# Patient Record
Sex: FEMALE | Race: WHITE | NOT HISPANIC OR LATINO | Employment: FULL TIME | URBAN - METROPOLITAN AREA
[De-identification: names, ages, dates, MRNs, and addresses within clinical notes are randomized per-mention and may not be internally consistent; named-entity substitution may affect disease eponyms.]

---

## 2020-11-23 ENCOUNTER — APPOINTMENT (OUTPATIENT)
Dept: CT IMAGING | Facility: HOSPITAL | Age: 20
End: 2020-11-23

## 2020-11-23 ENCOUNTER — HOSPITAL ENCOUNTER (EMERGENCY)
Facility: HOSPITAL | Age: 20
Discharge: HOME | End: 2020-11-23
Attending: EMERGENCY MEDICINE | Admitting: EMERGENCY MEDICINE

## 2020-11-23 VITALS
OXYGEN SATURATION: 98 % | RESPIRATION RATE: 16 BRPM | SYSTOLIC BLOOD PRESSURE: 119 MMHG | WEIGHT: 125 LBS | BODY MASS INDEX: 19.62 KG/M2 | TEMPERATURE: 97.3 F | HEART RATE: 95 BPM | DIASTOLIC BLOOD PRESSURE: 53 MMHG | HEIGHT: 67 IN

## 2020-11-23 DIAGNOSIS — R10.9 MIGRATORY ABDOMINAL PAIN: Primary | ICD-10-CM

## 2020-11-23 DIAGNOSIS — K59.00 CONSTIPATION, UNSPECIFIED CONSTIPATION TYPE: ICD-10-CM

## 2020-11-23 LAB
ALBUMIN SERPL-MCNC: 5.2 G/DL (ref 3.5–5.2)
ALBUMIN/GLOB SERPL: 1.6 G/DL
ALP SERPL-CCNC: 81 U/L (ref 39–117)
ALT SERPL W P-5'-P-CCNC: 9 U/L (ref 1–33)
ANION GAP SERPL CALCULATED.3IONS-SCNC: 13 MMOL/L (ref 5–15)
AST SERPL-CCNC: 18 U/L (ref 1–32)
B-HCG UR QL: NEGATIVE
BACTERIA UR QL AUTO: ABNORMAL /HPF
BASOPHILS # BLD AUTO: 0.05 10*3/MM3 (ref 0–0.2)
BASOPHILS NFR BLD AUTO: 0.5 % (ref 0–1.5)
BILIRUB SERPL-MCNC: 0.5 MG/DL (ref 0–1.2)
BILIRUB UR QL STRIP: NEGATIVE
BUN SERPL-MCNC: 11 MG/DL (ref 6–20)
BUN/CREAT SERPL: 16.9 (ref 7–25)
CALCIUM SPEC-SCNC: 10.3 MG/DL (ref 8.6–10.5)
CHLORIDE SERPL-SCNC: 103 MMOL/L (ref 98–107)
CLARITY UR: CLEAR
CO2 SERPL-SCNC: 23 MMOL/L (ref 22–29)
COLOR UR: YELLOW
CREAT SERPL-MCNC: 0.65 MG/DL (ref 0.57–1)
DEPRECATED RDW RBC AUTO: 38.5 FL (ref 37–54)
EOSINOPHIL # BLD AUTO: 0.06 10*3/MM3 (ref 0–0.4)
EOSINOPHIL NFR BLD AUTO: 0.6 % (ref 0.3–6.2)
ERYTHROCYTE [DISTWIDTH] IN BLOOD BY AUTOMATED COUNT: 11.9 % (ref 12.3–15.4)
GFR SERPL CREATININE-BSD FRML MDRD: 114 ML/MIN/1.73
GLOBULIN UR ELPH-MCNC: 3.3 GM/DL
GLUCOSE SERPL-MCNC: 95 MG/DL (ref 65–99)
GLUCOSE UR STRIP-MCNC: NEGATIVE MG/DL
HCT VFR BLD AUTO: 45.4 % (ref 34–46.6)
HGB BLD-MCNC: 15.2 G/DL (ref 12–15.9)
HGB UR QL STRIP.AUTO: NEGATIVE
HYALINE CASTS UR QL AUTO: ABNORMAL /LPF
IMM GRANULOCYTES # BLD AUTO: 0.04 10*3/MM3 (ref 0–0.05)
IMM GRANULOCYTES NFR BLD AUTO: 0.4 % (ref 0–0.5)
INTERNAL NEGATIVE CONTROL: NEGATIVE
INTERNAL POSITIVE CONTROL: POSITIVE
KETONES UR QL STRIP: NEGATIVE
LEUKOCYTE ESTERASE UR QL STRIP.AUTO: ABNORMAL
LIPASE SERPL-CCNC: 21 U/L (ref 13–60)
LYMPHOCYTES # BLD AUTO: 2.77 10*3/MM3 (ref 0.7–3.1)
LYMPHOCYTES NFR BLD AUTO: 28.3 % (ref 19.6–45.3)
Lab: NORMAL
MCH RBC QN AUTO: 29.7 PG (ref 26.6–33)
MCHC RBC AUTO-ENTMCNC: 33.5 G/DL (ref 31.5–35.7)
MCV RBC AUTO: 88.7 FL (ref 79–97)
MONOCYTES # BLD AUTO: 0.42 10*3/MM3 (ref 0.1–0.9)
MONOCYTES NFR BLD AUTO: 4.3 % (ref 5–12)
NEUTROPHILS NFR BLD AUTO: 6.44 10*3/MM3 (ref 1.7–7)
NEUTROPHILS NFR BLD AUTO: 65.9 % (ref 42.7–76)
NITRITE UR QL STRIP: NEGATIVE
NRBC BLD AUTO-RTO: 0 /100 WBC (ref 0–0.2)
PH UR STRIP.AUTO: <=5 [PH] (ref 5–8)
PLATELET # BLD AUTO: 391 10*3/MM3 (ref 140–450)
PMV BLD AUTO: 10.7 FL (ref 6–12)
POTASSIUM SERPL-SCNC: 4 MMOL/L (ref 3.5–5.2)
PROT SERPL-MCNC: 8.5 G/DL (ref 6–8.5)
PROT UR QL STRIP: NEGATIVE
RBC # BLD AUTO: 5.12 10*6/MM3 (ref 3.77–5.28)
RBC # UR: ABNORMAL /HPF
REF LAB TEST METHOD: ABNORMAL
SODIUM SERPL-SCNC: 139 MMOL/L (ref 136–145)
SP GR UR STRIP: <=1.005 (ref 1–1.03)
SQUAMOUS #/AREA URNS HPF: ABNORMAL /HPF
UROBILINOGEN UR QL STRIP: ABNORMAL
WBC # BLD AUTO: 9.78 10*3/MM3 (ref 3.4–10.8)
WBC UR QL AUTO: ABNORMAL /HPF

## 2020-11-23 PROCEDURE — 85025 COMPLETE CBC W/AUTO DIFF WBC: CPT | Performed by: EMERGENCY MEDICINE

## 2020-11-23 PROCEDURE — 80053 COMPREHEN METABOLIC PANEL: CPT | Performed by: EMERGENCY MEDICINE

## 2020-11-23 PROCEDURE — 99283 EMERGENCY DEPT VISIT LOW MDM: CPT

## 2020-11-23 PROCEDURE — 81001 URINALYSIS AUTO W/SCOPE: CPT

## 2020-11-23 PROCEDURE — 83690 ASSAY OF LIPASE: CPT | Performed by: EMERGENCY MEDICINE

## 2020-11-23 PROCEDURE — 87086 URINE CULTURE/COLONY COUNT: CPT | Performed by: EMERGENCY MEDICINE

## 2020-11-23 PROCEDURE — 81025 URINE PREGNANCY TEST: CPT | Performed by: EMERGENCY MEDICINE

## 2020-11-23 PROCEDURE — 74177 CT ABD & PELVIS W/CONTRAST: CPT

## 2020-11-23 PROCEDURE — 25010000002 IOPAMIDOL 61 % SOLUTION: Performed by: EMERGENCY MEDICINE

## 2020-11-23 RX ORDER — SODIUM CHLORIDE 0.9 % (FLUSH) 0.9 %
10 SYRINGE (ML) INJECTION AS NEEDED
Status: DISCONTINUED | OUTPATIENT
Start: 2020-11-23 | End: 2020-11-23 | Stop reason: HOSPADM

## 2020-11-23 RX ADMIN — IOPAMIDOL 85 ML: 612 INJECTION, SOLUTION INTRAVENOUS at 12:15

## 2020-11-23 NOTE — ED PROVIDER NOTES
Subjective   Pt presents with 1 week history of intermittent burning pain.  Sometimes R flank, sometimes RLQ, sometimes across upper abdomen.  When it comes on it lasts several minutes at a time.  Worse with activity, not related to eating.  No fever, vomiting, diarrhea, vaginal bleeding/discharge, urinary symptoms, chest pain, cough or dyspnea.  Never had this before.  Has not tried anything at home.  Went to [facility] and was referred to ED for further eval.  No chronic medical conditions, no prior abdominal surgeries.      History provided by:  Patient      Review of Systems   Constitutional: Negative for fever.   Respiratory: Negative for cough and shortness of breath.    Cardiovascular: Negative for chest pain.   Gastrointestinal: Positive for abdominal pain. Negative for diarrhea and vomiting.   Genitourinary: Negative for difficulty urinating, dysuria, vaginal bleeding and vaginal discharge.   All other systems reviewed and are negative.      History reviewed. No pertinent past medical history.    No Known Allergies    History reviewed. No pertinent surgical history.    No family history on file.    Social History     Socioeconomic History   • Marital status: Single     Spouse name: Not on file   • Number of children: Not on file   • Years of education: Not on file   • Highest education level: Not on file           Objective   Physical Exam  Vitals signs and nursing note reviewed.   Constitutional:       General: She is not in acute distress.     Appearance: Normal appearance. She is not ill-appearing.   HENT:      Head: Normocephalic and atraumatic.      Mouth/Throat:      Mouth: Mucous membranes are moist.   Eyes:      General: No scleral icterus.        Right eye: No discharge.         Left eye: No discharge.      Conjunctiva/sclera: Conjunctivae normal.   Neck:      Musculoskeletal: Normal range of motion and neck supple.   Cardiovascular:      Rate and Rhythm: Normal rate and regular rhythm.      Heart sounds:  No murmur.   Pulmonary:      Effort: Pulmonary effort is normal. No respiratory distress.      Breath sounds: Normal breath sounds. No wheezing.   Abdominal:      General: Bowel sounds are normal. There is no distension.      Palpations: Abdomen is soft.      Tenderness: There is abdominal tenderness in the right lower quadrant and left lower quadrant. There is no guarding or rebound.   Musculoskeletal: Normal range of motion.         General: No swelling.   Skin:     General: Skin is warm and dry.      Findings: No rash.   Neurological:      General: No focal deficit present.      Mental Status: She is alert. Mental status is at baseline.   Psychiatric:         Mood and Affect: Mood normal.         Behavior: Behavior normal.         Thought Content: Thought content normal.         Procedures           ED Course         Labs, UA benign.  CT shows stool burden.  May or may not be the cause but no evidence of acute serious pathology.  Will recommend Miralax and if that doesn't solve her pain then she should follow up. Patient stable on serial rechecks.  Discussed findings, concerns, plan of care, expected course, reasons to return and followup.  Provided the opportunity to ask questions.                                    MDM  Number of Diagnoses or Management Options  Constipation, unspecified constipation type:   Migratory abdominal pain:      Amount and/or Complexity of Data Reviewed  Clinical lab tests: reviewed and ordered  Tests in the radiology section of CPT®: reviewed and ordered  Independent visualization of images, tracings, or specimens: yes        Final diagnoses:   Migratory abdominal pain   Constipation, unspecified constipation type            Vickey Pinzon MD  11/23/20 1529

## 2020-11-24 LAB — BACTERIA SPEC AEROBE CULT: ABNORMAL

## 2021-04-01 ENCOUNTER — IMMUNIZATION (OUTPATIENT)
Dept: VACCINE CLINIC | Facility: HOSPITAL | Age: 21
End: 2021-04-01

## 2021-04-01 PROCEDURE — 91300 HC SARSCOV02 VAC 30MCG/0.3ML IM: CPT | Performed by: INTERNAL MEDICINE

## 2021-04-01 PROCEDURE — 0001A: CPT | Performed by: INTERNAL MEDICINE

## 2021-04-27 ENCOUNTER — IMMUNIZATION (OUTPATIENT)
Dept: VACCINE CLINIC | Facility: HOSPITAL | Age: 21
End: 2021-04-27

## 2021-04-27 PROCEDURE — 0002A: CPT | Performed by: INTERNAL MEDICINE

## 2021-04-27 PROCEDURE — 91300 HC SARSCOV02 VAC 30MCG/0.3ML IM: CPT | Performed by: INTERNAL MEDICINE

## 2022-06-20 ENCOUNTER — TELEMEDICINE (OUTPATIENT)
Dept: FAMILY MEDICINE CLINIC | Facility: TELEHEALTH | Age: 22
End: 2022-06-20
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
## 2022-06-20 DIAGNOSIS — S50.362A INSECT BITE OF LEFT ELBOW, INITIAL ENCOUNTER: Primary | ICD-10-CM

## 2022-06-20 PROCEDURE — UCCVTSP: Performed by: NURSE PRACTITIONER

## 2022-06-20 RX ORDER — NORETHINDRONE ACETATE AND ETHINYL ESTRADIOL AND FERROUS FUMARATE 1MG-20(24)
KIT ORAL
COMMUNITY
Start: 2022-06-12

## 2022-06-20 RX ORDER — CEPHALEXIN 500 MG/1
500 CAPSULE ORAL 4 TIMES DAILY
Qty: 20 CAPSULE | Refills: 0 | Status: SHIPPED | OUTPATIENT
Start: 2022-06-20 | End: 2022-06-25

## 2022-06-20 RX ORDER — TRIAMCINOLONE ACETONIDE 1 MG/G
1 CREAM TOPICAL 2 TIMES DAILY
Qty: 80 G | Refills: 0 | Status: SHIPPED | OUTPATIENT
Start: 2022-06-20 | End: 2022-07-04

## 2022-06-20 NOTE — PROGRESS NOTES
Subjective   Chief Complaint   Patient presents with   • Insect Bite       Marisela Terrell is a 22 y.o. female.     Patient reports recent trip to the beach.  While on the beach she was bit several times on her arms by what she thinks may have been a mosquito or similar insect.  She states there is 1 bite on the left elbow that has worsened over the past few days, it is red, more swollen and firm to touch.     Insect Bite  This is a new problem. Episode onset: 4 days. The problem occurs constantly. The problem has been gradually worsening. Associated symptoms include numbness (intermitent tingling of the left arm) and a rash. Pertinent negatives include no abdominal pain, anorexia, chest pain, chills, congestion, coughing, diaphoresis, fatigue, fever, headaches, joint swelling, myalgias, nausea, sore throat, swollen glands, visual change, vomiting or weakness. Treatments tried: benadryl insect rub. The treatment provided no relief.        No Known Allergies    History reviewed. No pertinent past medical history.    History reviewed. No pertinent surgical history.    Social History     Socioeconomic History   • Marital status: Single   Tobacco Use   • Smoking status: Never Smoker   • Smokeless tobacco: Never Used       History reviewed. No pertinent family history.      Current Outpatient Medications:   •  cephalexin (Keflex) 500 MG capsule, Take 1 capsule by mouth 4 (Four) Times a Day for 5 days., Disp: 20 capsule, Rfl: 0  •  Hoda 24 Fe 1-20 MG-MCG(24) chewable tablet, , Disp: , Rfl:   •  triamcinolone (KENALOG) 0.1 % cream, Apply 1 application topically to the appropriate area as directed 2 (Two) Times a Day for 14 days., Disp: 80 g, Rfl: 0      Review of Systems   Constitutional: Negative for chills, diaphoresis, fatigue and fever.   HENT: Negative for congestion, sore throat and swollen glands.    Respiratory: Negative for cough, chest tightness, shortness of breath and wheezing.    Cardiovascular:  Negative for chest pain and palpitations.   Gastrointestinal: Negative for abdominal pain, anorexia, nausea and vomiting.   Musculoskeletal: Negative for joint swelling and myalgias.   Skin: Positive for rash.   Neurological: Positive for numbness (intermitent tingling of the left arm). Negative for dizziness, weakness, light-headedness and headache.        There were no vitals filed for this visit.    Objective   Physical Exam  Constitutional:       General: She is not in acute distress.     Appearance: Normal appearance. She is not ill-appearing, toxic-appearing or diaphoretic.   HENT:      Head: Normocephalic.      Mouth/Throat:      Lips: Pink.      Mouth: Mucous membranes are moist.   Pulmonary:      Effort: Pulmonary effort is normal.   Skin:     Findings: Erythema and rash present. Rash is papular.          Neurological:      Mental Status: She is alert and oriented to person, place, and time.   Psychiatric:         Mood and Affect: Mood normal.         Behavior: Behavior normal.          Procedures     Assessment & Plan   Diagnoses and all orders for this visit:    1. Insect bite of left elbow, initial encounter (Primary)  -     cephalexin (Keflex) 500 MG capsule; Take 1 capsule by mouth 4 (Four) Times a Day for 5 days.  Dispense: 20 capsule; Refill: 0  -     triamcinolone (KENALOG) 0.1 % cream; Apply 1 application topically to the appropriate area as directed 2 (Two) Times a Day for 14 days.  Dispense: 80 g; Refill: 0      May apply cool compress for comfort and swelling.  May take an antihistamine of choice for itching if needed.  Alternate tylenol and motrin for pain.  If symptoms worsen or do not improve follow up with your PCP or visit your nearest Urgent Care Center or ER.      PLAN: Discussed dosing, side effects, recommended other symptomatic care.  Patient should follow up with primary care provider, Urgent Care or ER if symptoms worsen, fail to resolve or other symptoms need attention.  Patient/family agree to the above.         TERRY Trivedi     The use of a video visit has been reviewed with the patient and verbal informd consent has een obtained. Myself and Marisela Terrell participated in this visit. The patient is located at [address] [place] [address] [address]  [place] [id number]. I am located in [place], [place]. Mychart and Zoom were utilized.        This visit was performed via Telehealth.  This patient has been instructed to follow-up with their primary care provider if their symptoms worsen or the treatment provided does not resolve their illness.

## 2022-06-20 NOTE — PATIENT INSTRUCTIONS
May apply cool compress for comfort and swelling.  May take an antihistamine of choice for itching if needed.  Alternate tylenol and motrin for pain.  If symptoms worsen or do not improve follow up with your PCP or visit your nearest Urgent Care Center or ER.